# Patient Record
Sex: FEMALE | Race: WHITE
[De-identification: names, ages, dates, MRNs, and addresses within clinical notes are randomized per-mention and may not be internally consistent; named-entity substitution may affect disease eponyms.]

---

## 2020-05-01 ENCOUNTER — HOSPITAL ENCOUNTER (OUTPATIENT)
Dept: HOSPITAL 62 - ER | Age: 65
LOS: 1 days | Discharge: HOME | End: 2020-05-02
Attending: SURGERY
Payer: MEDICARE

## 2020-05-01 VITALS — DIASTOLIC BLOOD PRESSURE: 48 MMHG | SYSTOLIC BLOOD PRESSURE: 122 MMHG

## 2020-05-01 DIAGNOSIS — K80.10: Primary | ICD-10-CM

## 2020-05-01 DIAGNOSIS — K82.A1: ICD-10-CM

## 2020-05-01 DIAGNOSIS — Z88.9: ICD-10-CM

## 2020-05-01 DIAGNOSIS — I10: ICD-10-CM

## 2020-05-01 DIAGNOSIS — Z79.899: ICD-10-CM

## 2020-05-01 DIAGNOSIS — F17.210: ICD-10-CM

## 2020-05-01 DIAGNOSIS — K76.0: ICD-10-CM

## 2020-05-01 DIAGNOSIS — E78.5: ICD-10-CM

## 2020-05-01 LAB
ADD MANUAL DIFF: NO
ALBUMIN SERPL-MCNC: 4.2 G/DL (ref 3.5–5)
ALP SERPL-CCNC: 105 U/L (ref 38–126)
ANION GAP SERPL CALC-SCNC: 8 MMOL/L (ref 5–19)
APPEARANCE UR: (no result)
APTT PPP: YELLOW S
AST SERPL-CCNC: 29 U/L (ref 14–36)
BASOPHILS # BLD AUTO: 0 10^3/UL (ref 0–0.2)
BASOPHILS NFR BLD AUTO: 0.2 % (ref 0–2)
BILIRUB DIRECT SERPL-MCNC: 0 MG/DL (ref 0–0.4)
BILIRUB SERPL-MCNC: 1 MG/DL (ref 0.2–1.3)
BILIRUB UR QL STRIP: NEGATIVE
BUN SERPL-MCNC: 18 MG/DL (ref 7–20)
CALCIUM: 8.8 MG/DL (ref 8.4–10.2)
CHLORIDE SERPL-SCNC: 99 MMOL/L (ref 98–107)
CO2 SERPL-SCNC: 29 MMOL/L (ref 22–30)
EOSINOPHIL # BLD AUTO: 0 10^3/UL (ref 0–0.6)
EOSINOPHIL NFR BLD AUTO: 0.1 % (ref 0–6)
ERYTHROCYTE [DISTWIDTH] IN BLOOD BY AUTOMATED COUNT: 14.6 % (ref 11.5–14)
GLUCOSE SERPL-MCNC: 169 MG/DL (ref 75–110)
GLUCOSE UR STRIP-MCNC: 50 MG/DL
HCT VFR BLD CALC: 42.8 % (ref 36–47)
HGB BLD-MCNC: 14.6 G/DL (ref 12–15.5)
KETONES UR STRIP-MCNC: 20 MG/DL
LYMPHOCYTES # BLD AUTO: 1.1 10^3/UL (ref 0.5–4.7)
LYMPHOCYTES NFR BLD AUTO: 7.7 % (ref 13–45)
MCH RBC QN AUTO: 31.9 PG (ref 27–33.4)
MCHC RBC AUTO-ENTMCNC: 34.1 G/DL (ref 32–36)
MCV RBC AUTO: 94 FL (ref 80–97)
MONOCYTES # BLD AUTO: 0.4 10^3/UL (ref 0.1–1.4)
MONOCYTES NFR BLD AUTO: 3 % (ref 3–13)
NEUTROPHILS # BLD AUTO: 12.3 10^3/UL (ref 1.7–8.2)
NEUTS SEG NFR BLD AUTO: 89 % (ref 42–78)
NITRITE UR QL STRIP: NEGATIVE
PH UR STRIP: 5 [PH] (ref 5–9)
PLATELET # BLD: 199 10^3/UL (ref 150–450)
POTASSIUM SERPL-SCNC: 3.8 MMOL/L (ref 3.6–5)
PROT SERPL-MCNC: 7.2 G/DL (ref 6.3–8.2)
PROT UR STRIP-MCNC: 30 MG/DL
RBC # BLD AUTO: 4.58 10^6/UL (ref 3.72–5.28)
SP GR UR STRIP: 1.02
TOTAL CELLS COUNTED % (AUTO): 100 %
UROBILINOGEN UR-MCNC: NEGATIVE MG/DL (ref ?–2)
WBC # BLD AUTO: 13.8 10^3/UL (ref 4–10.5)

## 2020-05-01 PROCEDURE — 81001 URINALYSIS AUTO W/SCOPE: CPT

## 2020-05-01 PROCEDURE — 83690 ASSAY OF LIPASE: CPT

## 2020-05-01 PROCEDURE — 96375 TX/PRO/DX INJ NEW DRUG ADDON: CPT

## 2020-05-01 PROCEDURE — 96361 HYDRATE IV INFUSION ADD-ON: CPT

## 2020-05-01 PROCEDURE — 36415 COLL VENOUS BLD VENIPUNCTURE: CPT

## 2020-05-01 PROCEDURE — 99140 ANES COMP EMERGENCY COND: CPT

## 2020-05-01 PROCEDURE — 76705 ECHO EXAM OF ABDOMEN: CPT

## 2020-05-01 PROCEDURE — 96374 THER/PROPH/DIAG INJ IV PUSH: CPT

## 2020-05-01 PROCEDURE — 99285 EMERGENCY DEPT VISIT HI MDM: CPT

## 2020-05-01 PROCEDURE — 47562 LAPAROSCOPIC CHOLECYSTECTOMY: CPT

## 2020-05-01 PROCEDURE — 85025 COMPLETE CBC W/AUTO DIFF WBC: CPT

## 2020-05-01 PROCEDURE — 00790 ANES IPER UPR ABD NOS: CPT

## 2020-05-01 PROCEDURE — 93976 VASCULAR STUDY: CPT

## 2020-05-01 PROCEDURE — 88304 TISSUE EXAM BY PATHOLOGIST: CPT

## 2020-05-01 PROCEDURE — 80053 COMPREHEN METABOLIC PANEL: CPT

## 2020-05-01 PROCEDURE — 94762 N-INVAS EAR/PLS OXIMTRY CONT: CPT

## 2020-05-01 NOTE — DISCHARGE SUMMARY
Discharge Summary (SDC)





- Discharge


Final Diagnosis: 





acute cholecystitis


Date of Surgery: 05/01/20


Discharge Date: 05/01/20


Condition: Good


Forms:  [facility] Anesthesia D/C Instruction, Discharge POC-Surgical Service


Prescriptions: 


Hydrocodone/Acetaminophen [Norco  Tablet] 1 each PO Q6HP PRN #15 tablet


 PRN Reason: 


Referrals: 


GEORGIA MORSE MD [ACTIVE STAFF] - 


ZIGGY DON PA-C [Primary Care Provider] - Follow up as needed (Follow up with

Dr Morse in 7 to 10 days. Call for appointment time on Monday when office 

open.)


Respiratory Treatments at Home: Deep Breathing/Coughing


Discharge Activity: Activity As Tolerated, No Lifting Over 10 Pounds, No 

Lifting/Push/Pulling, No tub bath


Home Care Assistance: Provided by Family


Report the Following to Your Physician Immediately: Shortness of Breath, 

Increase in Pain, Fever over 101 Degrees, Unusual Bleeding, Redness, Swelling, 

Warmth, Increased Soreness, Drainage-Yellow, Drainage-Foul Smelling, IV Site 

Infection Signs

## 2020-05-01 NOTE — ER DOCUMENT REPORT
ED GI/





- General


Mode of Arrival: Ambulatory


Information source: Patient


TRAVEL OUTSIDE OF THE [place] IN LAST 30 DAYS: No





<WELLEN,SHERYLE D - Last Filed: 05/01/20 07:57>





<PATTI ART - Last Filed: 05/01/20 10:34>





- General


Chief Complaint: Flank Pain


Stated Complaint: FLANK PAIN


Time Seen by Provider: 05/01/20 05:01


Notes: 





65-year-old female past medical history significant for hypertension, 

hyperlipidemia presents to the emergency room complaining of right upper 

quadrant and right flank pain that started around 10:30 PM last night.  Patient 

states she has been nauseous and vomited.  Patient states she had some pain last

Wednesday but only lasted for a few hours and resolved on its own.  Patient 

states she had a history of gallbladder disease several years ago but has not 

had any issues with it in over 10 years.  Patient states she did eat macaroni 

salad, ribs and corn on the cob last night for dinner.  Denies any fevers.  

Denies any urinary symptoms.  No medications for symptoms.  Drove self to the 

emergency room. (WELLEN,SHERYLE D)





- Related Data


Allergies/Adverse Reactions: 


                                        





aspirin Allergy (Severe, Verified 08/28/19 11:02)


   TONGUE SWELLS











Past Medical History





- General


Information source: Patient





- Social History


Smoking Status: Current Every Day Smoker


Frequency of alcohol use: None


Drug Abuse: None


Family History: Reviewed & Not Pertinent





- Past Medical History


Cardiac Medical History: Reports: Hx Hypercholesterolemia, Hx Hypertension - 

STARTED NEW BP MED,HAS F/U ON 9/10 FOR CLEARANCE


   Denies: Hx Heart Attack


Pulmonary Medical History: 


   Denies: Hx Asthma


Neurological Medical History: Denies: Hx Cerebrovascular Accident, Hx Seizures


GI Medical History: Denies: Hx Hepatitis, Hx Hiatal Hernia, Hx Ulcer


Infectious Medical History: Denies: Hx Hepatitis


Past Surgical History: Denies: Hx Hysterectomy, Hx Mastectomy, Hx Open Heart 

Surgery, Hx Pacemaker





<WELLEN,SHERYLE D - Last Filed: 05/01/20 07:57>





Review of Systems





- Review of Systems


Constitutional: No symptoms reported


Cardiovascular: No symptoms reported


Respiratory: No symptoms reported


Gastrointestinal: Abdominal pain, Nausea, Vomiting


Genitourinary: Flank pain.  denies: Dysuria, Hematuria, Urgency


Musculoskeletal: No symptoms reported


-: Yes All other systems reviewed and negative





<WELLEN,SHERYLE D - Last Filed: 05/01/20 07:57>





Physical Exam





- General


General appearance: Appears well, Alert


In distress: Moderate





- HEENT


Head: Normocephalic, Atraumatic


Eyes: Normal


Pupils: PERRL





- Respiratory


Respiratory status: No respiratory distress


Chest status: Nontender


Breath sounds: Normal


Chest palpation: Normal





- Cardiovascular


Rhythm: Regular


Heart sounds: Normal auscultation


Murmur: No





- Abdominal


Inspection: Normal


Distension: No distension


Bowel sounds: Normal


Tenderness: Tender, Casillas's sign.  No: Guarding, Rebound


Organomegaly: No organomegaly





- Back


Back: Normal, CVA tenderness - Right-sided CVA tenderness





- Neurological


Neuro grossly intact: Yes


Cognition: Normal


Orientation: AAOx4


[place] Coma Scale Eye Opening: Spontaneous


[place] Coma Scale Verbal: Oriented


Vic Coma Scale Motor: Obeys Commands


Vic Coma Scale Total: 15


Speech: Normal


Motor strength normal: LUE, RUE, LLE, RLE


Sensory: Normal





- Skin


Skin Temperature: Warm


Skin Moisture: Dry


Skin Color: Normal





<WELLEN,SHERYLE D - Last Filed: 05/01/20 07:57>





- Vital signs


Vitals: 


                                        











Temp Pulse Resp BP Pulse Ox


 


 97.9 F   72   16   179/64 H  100 


 


 05/01/20 04:24  05/01/20 04:24  05/01/20 04:24  05/01/20 04:24  05/01/20 04:24














Course





- Laboratory


Result Diagrams: 


                                 05/01/20 05:56





                                 05/01/20 05:56





<WELLEN,SHERYLE D - Last Filed: 05/01/20 07:57>





- Laboratory


Result Diagrams: 


                                 05/01/20 05:56





                                 05/01/20 07:40





- Diagnostic Test


Radiology reviewed: Reports reviewed





<PATTI ART - Last Filed: 05/01/20 10:34>





- Re-evaluation


Re-evalutation: 





05/01/20 07:22


Patient is resting comfortably sleeping.  Aware lab and ultrasound are still 

pending.


05/01/20 07:57


Patient signed out to Patti PERES all labs and pending Ultrasound. 

(WELLEN,SHERYLE D)





05/01/20 09:33


Consulted with Dr. Villarreal who agrees to come and evaluate patient.  Patient 

with ultrasound findings worrisome for acute cholecystitis.  Patient does have 

leukocytosis shift and has had persistent pain medicine despite repeated doses 

of narcotic medication.


05/01/20 10:20


Dr. Villarreal evaluated patient and plans to take her to the operating room.   

(PATTI ART)





- Vital Signs


Vital signs: 


                                        











Temp Pulse Resp BP Pulse Ox


 


 98.0 F   61   16   189/71 H  100 


 


 05/01/20 08:46  05/01/20 08:46  05/01/20 04:30  05/01/20 08:46  05/01/20 08:46














- Laboratory


Laboratory results interpreted by me: 


                                        











  05/01/20 05/01/20 05/01/20





  05:56 07:05 07:40


 


WBC  13.8 H  


 


RDW  14.6 H  


 


Lymph % (Auto)  7.7 L  


 


Absolute Neuts (auto)  12.3 H  


 


Seg Neutrophils %  89.0 H  


 


Sodium    136.0 L


 


Glucose    169 H


 


Lipase    320.9 H


 


Urine Protein   30 H 


 


Urine Glucose (UA)   50 H 


 


Urine Ketones   20 H 


 


Ur Leukocyte Esterase   TRACE H 


 


Urine Ascorbic Acid   40 H 














Discharge





<WELLEN,SHERYLE D - Last Filed: 05/01/20 07:57>





- Discharge


Admitting Provider: Surgicalist


Unit Admitted: Surgical Floor





<PATTI ART - Last Filed: 05/01/20 10:34>





- Discharge


Clinical Impression: 


 Cholecystitis





Abdominal pain


Qualifiers:


 Abdominal location: right upper quadrant Qualified Code(s): R10.11 - Right 

upper quadrant pain





Condition: Stable


Disposition: ADMITTED AS INPATIENT

## 2020-05-01 NOTE — RADIOLOGY REPORT (SQ)
EXAM DESCRIPTION:  U/S ABDOMEN LTD W/DOPPLER



IMAGES COMPLETED DATE/TIME:  5/1/2020 8:41 am



REASON FOR STUDY:  right upper quadrant pain



COMPARISON:  None.



TECHNIQUE:  Dynamic and static grayscale images acquired of the abdomen and recorded on PACS. Additio
hal selected color Doppler and spectral images recorded.



LIMITATIONS:  None.



FINDINGS:  PANCREAS: No masses.  Visualized pancreatic duct normal caliber.

LIVER: Increased echogenicity with decreased visualization of the portal triads.  No focal lesions.  
Liver measures 16.9 cm.

LIVER VASCULATURE: Normal directional flow of the main portal vein and hepatic veins.

GALLBLADDER: Shadowing stones and sludge within the gallbladder lumen.  Trace pericholecystic fluid. 
 Gallbladder is distended measuring 4.3 cm transversely.  Gallbladder wall measures 2 mm.

ULTRASOUND-DETECTED CASILLAS'S SIGN: Reportedly positive

INTRAHEPATIC DUCTS AND COMMON DUCT: CBD measures 8 mm.  No obstructing lesion identified.

INFERIOR VENA CAVA: Normal flow.

AORTA: Proximal aorta measures 2.7 cm.

RIGHT KIDNEY:  Normal size measuring 10.2 cm. Normal echogenicity. No solid or suspicious masses. No 
hydronephrosis. No calcifications.

PERITONEAL AND RIGHT PLEURAL SPACE: No ascites or effusions.

OTHER: No other significant findings.



IMPRESSION:  1.  Findings suggestive of acute cholecystitis with dilated gallbladder, stones and slud
ge, trace pericholecystic fluid and reported sonographic Casillas's sign.  Recommend surgical evaluatio
n.

2.  CBD measures 8 mm.  No obstructing stone identified.  No intrahepatic ductal dilation.  Choledoch
olithiasis is not excluded.

3.  Hepatic steatosis.

4.  Proximal aorta measures 2.7 cm.  Follow-up as below.

Findings discussed with Dr. Bruno at 0850 hours on 5/1/2020.



COMMENT:   AAA Size:            Follow-up Recommendation

2.6-2.9 cm Every 5 years*____________________________________________________________________________
_________

*Based upon the Society for Vascular Surgery Guidelines: J Vasc Surg. 2009 Oct;50(4 Suppl):S2-49

*For aortas of maximum diameter of 2.6-2.9 cm meeting the criteria for AAA (?1.5 x proximal normal se
gment)



TECHNICAL DOCUMENTATION:  JOB ID:  [id number]

 2011 [employer]- All Rights Reserved



Reading location - IP/workstation name: [email or web address]

## 2020-05-01 NOTE — PDOC H&P
History of Present Illness


Admission Date/PCP: 


  





  ZIGGY DON PA-C





Patient complains of: right upper quadrent pain


History of Present Illness: 


YOU MAYNARD is a 65 year old female








Past Medical History


Cardiac Medical History: Reports: Hyperlipidema, Hypertension - STARTED NEW BP 

MED,HAS F/U ON 9/10 FOR CLEARANCE


   Denies: Myocardial Infarction


Pulmonary Medical History: 


   Denies: Asthma


Neurological Medical History: 


   Denies: Seizures


GI Medical History: 


   Denies: Hepatitis, Hiatal Hernia


Musculoskeltal Medical History: Reports: Arthritis


Hematology: 


   Denies: Anemia, Sickle Cell Disease





Past Surgical History


Past Surgical History: 


   Denies: Amputation, Hysterectomy, Mastectomy, Pacemaker





Social History


Smoking Status: Current Every Day Smoker


Electronic Cigarette use?: No





Family History


Family History: Reviewed & Not Pertinent


Parental Family History Reviewed: No


Children Family History Reviewed: NA


Sibling(s) Family History Reviewed.: NA





Medication/Allergy


Home Medications: 








Acetaminophen/Diphenhydramine [Tylenol Pm Ex-Strength Caplet] 1 each PO PRN PRN 

08/22/19 


Lisinopril 20 mg PO DAILY 09/04/19 








Allergies/Adverse Reactions: 


                                        





aspirin Allergy (Severe, Verified 08/28/19 11:02)


   TONGUE SWELLS











Review of Systems


Constitutional: PRESENT: fatigue.  ABSENT: as per HPI, anorexia, chills, 

fever(s), headache(s), night sweats, weakness, weight gain, weight loss, other


Ears: ABSENT: as per HPI, hearing changes, other


Nose, Mouth, and Throat: ABSENT: as per HPI, headache(s), mouth pain, sore throa

t, vertigo, other


Breasts: PRESENT: as per HPI


Cardiovascular: ABSENT: as per HPI, chest pain, dyspnea on exertion, edema, 

orthropnea, palpitations, other


Gastrointestinal: ABSENT: as per HPI, abdominal pain, bloating, coffee ground 

emesis, constipation, diarrhea, dysphagia, heartburn, hematemesis, hematochezia,

melena, nausea, vomiting, other


Genitourinary: ABSENT: as per HPI, difficulty urinating, dysuria, hematuria, 

nocturia, other


Musculoskeletal: ABSENT: as per HPI, back pain, deformity, joint swelling, 

muscle weakness, other


Integumentary: ABSENT: as per HPI, diaphoresis, erythema, lesions, pruritus, 

rash, wounds, other


Neurological: ABSENT: as per HPI, abnormal gait, abnormal movements, abnormal 

speech, confusion, convulsions, dizziness, focal weakness, frequent falls, lack 

of coordination, memory loss, numbness, paresthesias, restless legs, syncope, 

tingling, tremor(s), vertigo, weakness, other


Psychiatric: ABSENT: as per HPI, anxiety, depression, hallucinations, homidical 

ideation, suicidal ideation, other


Endocrine: ABSENT: as per HPI, cold intolerance, flushing, heat intolerance, 

menstrual abnormalities, polydipsia, polyphagia, polyuria, other


Hematologic/Lymphatic: ABSENT: as per HPI, easy bleeding, easy bruising, lymp

hadenopathy, other


Allergic/Immunologic: ABSENT: as per HPI, seasonal rhinorrhea, other





Physical Exam


Vital Signs: 


                                        











Temp Pulse Resp BP Pulse Ox


 


 98.0 F   61   16   189/71 H  100 


 


 05/01/20 08:46  05/01/20 08:46  05/01/20 04:30  05/01/20 08:46  05/01/20 08:46








                                 Intake & Output











 04/30/20 05/01/20 05/02/20





 06:59 06:59 06:59


 


Intake Total   1000


 


Balance   1000


 


Weight  69.7 kg 











General appearance: PRESENT: mild distress


Head exam: PRESENT: normocephalic


Eye exam: PRESENT: EOMI


Ear exam: PRESENT: normal external ear exam


Mouth exam: PRESENT: moist


Teeth exam: PRESENT: poor dentation


Neck exam: PRESENT: full ROM


Respiratory exam: PRESENT: clear to auscultation blayne


Cardiovascular exam: PRESENT: RRR


Pulses: PRESENT: normal radial pulses, normal femoral pulses


Breast: PRESENT: Normal


GI/Abdominal exam: PRESENT: Casillas's sign


Rectal exam: PRESENT: deferred


Extremities exam: PRESENT: full ROM


Musculoskeletal exam: PRESENT: full ROM


Neurological exam: PRESENT: alert, awake, oriented to person


Psychiatric exam: PRESENT: appropriate affect


Skin exam: PRESENT: dry





Results


Laboratory Results: 


                                        





                                 05/01/20 05:56 





                                 05/01/20 07:40 





                                        











  05/01/20 05/01/20 05/01/20





  05:56 05:56 07:05


 


WBC  13.8 H  


 


RBC  4.58  


 


Hgb  14.6  


 


Hct  42.8  


 


MCV  94  


 


MCH  31.9  


 


MCHC  34.1  


 


RDW  14.6 H  


 


Plt Count  199  


 


Seg Neutrophils %  89.0 H  


 


Sodium   Cancelled 


 


Potassium   Cancelled 


 


Chloride   Cancelled 


 


Carbon Dioxide   Cancelled 


 


Anion Gap   Cancelled 


 


BUN   Cancelled 


 


Creatinine   Cancelled 


 


Est GFR ( Amer)   Cancelled 


 


Est GFR (Non-Af Amer)   Cancelled 


 


Glucose   Cancelled 


 


Calcium   Cancelled 


 


Total Bilirubin   Cancelled 


 


AST   Cancelled 


 


Alkaline Phosphatase   Cancelled 


 


Total Protein   Cancelled 


 


Albumin   Cancelled 


 


Lipase   Cancelled 


 


Urine Color    YELLOW


 


Urine Appearance    SLIGHTLY-CLOUDY


 


Urine pH    5.0


 


Ur Specific Gravity    1.024


 


Urine Protein    30 H


 


Urine Glucose (UA)    50 H


 


Urine Ketones    20 H


 


Urine Blood    NEGATIVE


 


Urine Nitrite    NEGATIVE


 


Ur Leukocyte Esterase    TRACE H


 


Urine WBC (Auto)    5


 


Urine RBC (Auto)    2














  05/01/20





  07:40


 


WBC 


 


RBC 


 


Hgb 


 


Hct 


 


MCV 


 


MCH 


 


MCHC 


 


RDW 


 


Plt Count 


 


Seg Neutrophils % 


 


Sodium  136.0 L


 


Potassium  3.8


 


Chloride  99


 


Carbon Dioxide  29


 


Anion Gap  8


 


BUN  18


 


Creatinine  0.72


 


Est GFR ( Amer)  > 60


 


Est GFR (Non-Af Amer) 


 


Glucose  169 H


 


Calcium  8.8


 


Total Bilirubin  1.0


 


AST  29


 


Alkaline Phosphatase  105


 


Total Protein  7.2


 


Albumin  4.2


 


Lipase  320.9 H


 


Urine Color 


 


Urine Appearance 


 


Urine pH 


 


Ur Specific Gravity 


 


Urine Protein 


 


Urine Glucose (UA) 


 


Urine Ketones 


 


Urine Blood 


 


Urine Nitrite 


 


Ur Leukocyte Esterase 


 


Urine WBC (Auto) 


 


Urine RBC (Auto) 











Impressions: 


                                        





Abdomen Ultrasound  05/01/20 06:21


IMPRESSION:  1.  Findings suggestive of acute cholecystitis with dilated 

gallbladder, stones and sludge, trace pericholecystic fluid and reported 

sonographic Casillas's sign.  Recommend surgical evaluation.


2.  CBD measures 8 mm.  No obstructing stone identified.  No intrahepatic ductal

dilation.  Choledocholithiasis is not excluded.


3.  Hepatic steatosis.


4.  Proximal aorta measures 2.7 cm.  Follow-up as below.


Findings discussed with Dr. Bruno at 0850 hours on 5/1/2020.


 














Assessment & Plan





- Plan Summary


Plan Summary: 





Impression:


acute cholecysttis


will plan on lap raymundo


risks benifits discussed.

## 2020-05-01 NOTE — OPERATIVE REPORT
Nonrecallable Operative Report


DATE OF SURGERY: 05/01/20


PREOPERATIVE DIAGNOSIS: Acute cholecystitis


POSTOPERATIVE DIAGNOSIS: Acute cholecystitis


OPERATION: Laparoscopic cholecystectomy


SURGEON: GEORGIA MORSE


ANESTHESIA: GA


TISSUE REMOVED OR ALTERED: Gallbladder


COMPLICATIONS: 








None


ESTIMATED BLOOD LOSS: 25 cc


INTRAOPERATIVE FINDINGS: Acute cholecystitis


PROCEDURE: 





After obtaining informed consent, the patient was taken to the operating room.  

General  Anesthesia was induced; the arms were extended, and the abdomen was 

exposed, and prepped and draped in a sterile fashion.  Instrumentation was set 

up for laparoscopic cholecystectomy.


 


Surgical plan and surgical timeout were conducted.


 


A vertical incision was made above the umbilicus, and a verres needle was 

inserted uneventfully into the peritoneal cavity.  Pneumoperitoneum was 

established.


 


The verres needle was removed and a 10 mm trocar was inserted and a 10 mm e 

laparoscope was inserted.  Visualization of the peritoneal cavity confirmed safe

uneventful entry.  Under direct visualization 3 additional 5 mm ports were esta

blished, one in the subxiphoid position and second in the subcostal position.  

Visualization of the hepatobiliary anatomy revealed no anatomic variations.  A 

grasper was placed on the fundus of the gallbladder and the gallbladder is 

elevated over the right surface of the liver; a second grasper was used to grasp

the infundibulum of the gallbladder.  The neck of the gallbladder and junction 

with the cystic duct was dissected out.  The Cystic artery was in its usual 

location medial and cephalad to the cystic duct.  The cystic artery was 

surrounded with a right angle clamp, clipped twice proximally and divided with 

laparoscopic scissors.


 


We now opened the triangle of Calot by dividing the peritoneal reflection on 

both the medial and lateral sides of the cystic duct infundibular junction.  The

critical view was obtained.  We now milked the cystic duct of any possible 

stones, clipped the cystic duct approximately 2 times once distally and divided 

with scissors.


 


The gallbladder was now removed from the undersurface of the liver using hook 

cautery dissection.  Graspers were repositioned and the gallbladder was removed 

uneventfully from the abdominal cavity through the super umbilical port site 

incision.  The specimen was examined, then passed off to pathology for permanent

analysis.


 


We returned to the peritoneal cavity check for bleeding, and evidence of bile 

leak, and there was none.  We  Confirmed satisfactory placement of clips on 

cystic duct and cystic artery were secured .  At this point we felt  the 

operation was complete.  The subcutaneous tissue was then anesthetized  with 

quarter percent Marcaine Sponge and needle counts are correct.  All ports 

removed under direct visualization pneumoperitoneum evacuated, and 5 mm port 

wounds closed with 3-0 Vicryl suture, benzoin and Steri-Strips.


 


The patient was extubated, and taken to the recovery room in stable condition.